# Patient Record
Sex: MALE | Race: OTHER | HISPANIC OR LATINO | Employment: FULL TIME | URBAN - METROPOLITAN AREA
[De-identification: names, ages, dates, MRNs, and addresses within clinical notes are randomized per-mention and may not be internally consistent; named-entity substitution may affect disease eponyms.]

---

## 2024-09-27 ENCOUNTER — HOSPITAL ENCOUNTER (EMERGENCY)
Facility: HOSPITAL | Age: 24
Discharge: HOME | End: 2024-09-27
Attending: FAMILY MEDICINE

## 2024-09-27 VITALS
BODY MASS INDEX: 28.12 KG/M2 | DIASTOLIC BLOOD PRESSURE: 82 MMHG | SYSTOLIC BLOOD PRESSURE: 127 MMHG | RESPIRATION RATE: 20 BRPM | WEIGHT: 175 LBS | HEIGHT: 66 IN | TEMPERATURE: 99 F | OXYGEN SATURATION: 100 % | HEART RATE: 65 BPM

## 2024-09-27 DIAGNOSIS — J10.1 INFLUENZA A: Primary | ICD-10-CM

## 2024-09-27 LAB
FLUAV AG UPPER RESP QL IA.RAPID: DETECTED
FLUBV AG UPPER RESP QL IA.RAPID: NOT DETECTED
SARS-COV-2 RNA RESP QL NAA+PROBE: NOT DETECTED
STREP A PCR (OHS): NOT DETECTED

## 2024-09-27 PROCEDURE — 0240U COVID/FLU A&B PCR: CPT | Performed by: PHYSICIAN ASSISTANT

## 2024-09-27 PROCEDURE — 99283 EMERGENCY DEPT VISIT LOW MDM: CPT

## 2024-09-27 PROCEDURE — 87651 STREP A DNA AMP PROBE: CPT | Performed by: FAMILY MEDICINE

## 2024-09-27 RX ORDER — OSELTAMIVIR PHOSPHATE 75 MG/1
75 CAPSULE ORAL 2 TIMES DAILY
Qty: 10 CAPSULE | Refills: 0 | Status: SHIPPED | OUTPATIENT
Start: 2024-09-27 | End: 2024-10-02

## 2024-09-27 NOTE — ED PROVIDER NOTES
Encounter Date: 9/27/2024       History     Chief Complaint   Patient presents with    Headache     Headache and fever for 2 days. Also having throat pain.      Patient presents to ER today with a complaint of headache, congestion, sore throat, fever.  Symptoms been ongoing for the past 2 days.    The history is provided by the patient. The history is limited by a language barrier. A  was used.     Review of patient's allergies indicates:  No Known Allergies  History reviewed. No pertinent past medical history.  History reviewed. No pertinent surgical history.  No family history on file.  Social History     Tobacco Use    Smoking status: Never    Smokeless tobacco: Never   Substance Use Topics    Drug use: Never     Review of Systems   Constitutional:  Positive for fever.   HENT:  Positive for congestion and sore throat.    Respiratory:  Positive for cough.    Neurological:  Positive for headaches.   All other systems reviewed and are negative.      Physical Exam     Initial Vitals [09/27/24 1445]   BP Pulse Resp Temp SpO2   127/82 65 20 98.9 °F (37.2 °C) 100 %      MAP       --         Physical Exam    Nursing note and vitals reviewed.  Constitutional: He appears well-developed and well-nourished.   HENT:   Head: Normocephalic and atraumatic.   Right Ear: External ear normal.   Left Ear: External ear normal.   Nose: Nose normal.   Posterior pharynx with erythema, slight swelling, voice normal.  Able to swallow secretions well   Eyes: Conjunctivae and EOM are normal. Pupils are equal, round, and reactive to light.   Neck: Neck supple.   Normal range of motion.  Cardiovascular:  Normal rate, regular rhythm and intact distal pulses.           Pulmonary/Chest: Breath sounds normal.   Musculoskeletal:         General: Normal range of motion.      Cervical back: Normal range of motion and neck supple.     Neurological: He is alert and oriented to person, place, and time. He has normal strength.    Skin: Skin is warm and dry. Capillary refill takes less than 2 seconds.   Psychiatric: He has a normal mood and affect. His behavior is normal. Judgment and thought content normal.         ED Course   Procedures  Labs Reviewed   COVID/FLU A&B PCR - Abnormal       Result Value    Influenza A PCR Detected (*)     Influenza B PCR Not Detected      SARS-CoV-2 PCR Not Detected      Narrative:     The Xpert Xpress SARS-CoV-2/FLU/RSV plus is a rapid, multiplexed real-time PCR test intended for the simultaneous qualitative detection and differentiation of SARS-CoV-2, Influenza A, Influenza B, and respiratory syncytial virus (RSV) viral RNA in either nasopharyngeal swab or nasal swab specimens.         STREP GROUP A BY PCR - Normal    STREP A PCR ([facility]) Not Detected      Narrative:     The Xpert Xpress Strep A test is a rapid, qualitative in vitro diagnostic test for the detection of Streptococcus pyogenes (Group A ß-hemolytic Streptococcus, Strep A) in throat swab specimens from patients with signs and symptoms of pharyngitis.            Imaging Results    None          Medications - No data to display  Medical Decision Making  COVID, flu, migraine headache, strep throat, URI    Amount and/or Complexity of Data Reviewed  Labs: ordered.     Details: Flu was positive for influenza A.  COVID and strep were negative    Risk  Prescription drug management.  Risk Details: All test results discussed with patient.  Patient to take the Tamiflu twice a day.  Drink plenty of fluids.  Alternate Tylenol and Motrin for fever or body aches.  Claritin or Zyrtec for congestion.  Follow up with his primary care doctor next week.  If no primary care doctor he may follow up with the [facility].  Language line was used to go over test results and treatment plan.  Patient verbalized understanding and agrees to treatment plan.  Patient to return to the ER for worsening symptoms or condition                                       Clinical Impression:  Final diagnoses:  [J10.1] Influenza A (Primary)          ED Disposition Condition    Discharge Stable          ED Prescriptions       Medication Sig Dispense Start Date End Date Auth. Provider    oseltamivir (TAMIFLU) 75 MG capsule Take 1 capsule (75 mg total) by mouth 2 (two) times daily. for 5 days 10 capsule 9/27/2024 10/2/2024 Catherine Armstrong PA          Follow-up Information       Follow up With Specialties Details Why Contact Info    [facility]    [phone]             Catherine Armstrong PA  09/27/24 2812

## 2024-09-27 NOTE — DISCHARGE INSTRUCTIONS
Po[place]u Tamiflu según lo prescrito.  Alterne Tylenol y Motrin para la fiebre y los brandon corporales.  Carley gárgaras con agua salada tibia.  Claritin o Zyrtec para la congestión.  Carley un seguimiento con zhang médico de atención primaria en 3-4 días.  Si no hay un médico de atención primaria, puede realizar un seguimiento con la [facility].  Regrese a la marilynn de emergencias si los síntomas o la afección empeoran.      Take the Tamiflu as prescribed.  Alternate Tylenol and Motrin for fever and body aches.  Warm saltwater gargles.  Claritin or Zyrtec for congestion.  Follow up with your primary care doctor in 3-4 days.  If no primary care doctor you may follow up with the [facility].  Return to the ER for worsening symptoms or condition.